# Patient Record
Sex: MALE | Race: OTHER | Employment: FULL TIME | URBAN - METROPOLITAN AREA
[De-identification: names, ages, dates, MRNs, and addresses within clinical notes are randomized per-mention and may not be internally consistent; named-entity substitution may affect disease eponyms.]

---

## 2019-03-06 ENCOUNTER — HOSPITAL ENCOUNTER (OUTPATIENT)
Age: 44
Discharge: HOME | End: 2019-03-06
Attending: FAMILY MEDICINE
Payer: COMMERCIAL

## 2019-03-06 VITALS
TEMPERATURE: 99 F | HEART RATE: 92 BPM | RESPIRATION RATE: 18 BRPM | DIASTOLIC BLOOD PRESSURE: 72 MMHG | SYSTOLIC BLOOD PRESSURE: 115 MMHG | OXYGEN SATURATION: 100 %

## 2019-03-06 DIAGNOSIS — R07.0 THROAT PAIN: Primary | ICD-10-CM

## 2019-03-06 LAB — S PYO AG THROAT QL: NEGATIVE

## 2019-03-06 PROCEDURE — 87430 STREP A AG IA: CPT

## 2019-03-06 PROCEDURE — 99212 OFFICE O/P EST SF 10 MIN: CPT

## 2019-03-06 PROCEDURE — 99202 OFFICE O/P NEW SF 15 MIN: CPT

## 2019-03-06 NOTE — ED INITIAL ASSESSMENT (HPI)
Pt here with complaints of a blister like bump he noticed to the top of his throat that developed 2 days ago, pt states the blister is very bother some especially when he is eating, pt denies any sore throat or fevers

## 2019-03-06 NOTE — ED PROVIDER NOTES
Patient Seen in: [address]    History   Patient presents with:  Sore Throat    Stated Complaint: May have blister behind throat    HPI    29-year-old male presents with a lesion on the back of his throat.   He notes rodriguez Cardiovascular: Normal rate and regular rhythm. Pulmonary/Chest: Effort normal and breath sounds normal.   Abdominal: Soft. Lymphadenopathy:     He has no cervical adenopathy. Neurological: He is alert and oriented to person, place, and time.    Ski

## 2019-04-01 ENCOUNTER — HOSPITAL ENCOUNTER (OUTPATIENT)
Age: 44
Discharge: HOME | End: 2019-04-01
Attending: FAMILY MEDICINE
Payer: COMMERCIAL

## 2019-04-01 VITALS
HEART RATE: 73 BPM | RESPIRATION RATE: 18 BRPM | WEIGHT: 147 LBS | OXYGEN SATURATION: 100 % | HEIGHT: 67 IN | SYSTOLIC BLOOD PRESSURE: 123 MMHG | BODY MASS INDEX: 23.07 KG/M2 | DIASTOLIC BLOOD PRESSURE: 59 MMHG | TEMPERATURE: 98 F

## 2019-04-01 DIAGNOSIS — T14.8XXA ABRASION OF SKIN: ICD-10-CM
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
## 2019-04-01 PROCEDURE — 99214 OFFICE O/P EST MOD 30 MIN: CPT

## 2019-04-01 PROCEDURE — 99213 OFFICE O/P EST LOW 20 MIN: CPT

## 2019-04-01 NOTE — ED INITIAL ASSESSMENT (HPI)
Pt in [address] [place] [address] c/o bug bite on left buttock/upper thigh noted 2 days ago. Pt reports bite has increased in size, slight tingling sensation and tenderness. Denied itching or pain. He reports slight headache. Applied otc cream to affected area.

## 2019-04-01 NOTE — ED PROVIDER NOTES
Patient Seen in: [address]    History   Patient presents with:  Rash Skin Problem (integumentary)    Stated Complaint: Bug Bite on Left thigh, slight headache     HPI    45-year-old male presents with a lesion on his ri sounds are normal.   Musculoskeletal: Normal range of motion. Neurological: He is alert and oriented to person, place, and time. Skin: Skin is warm and dry.    Left buttocks: 2 cm x 1 cm crusted abrasion with mildly erythematous borders no crepitus, flu

## 2023-03-30 DIAGNOSIS — D49.0 DUDLEY-KLINGENSTEIN SYNDROME: Primary | ICD-10-CM

## 2023-04-16 ENCOUNTER — HOSPITAL ENCOUNTER (OUTPATIENT)
Dept: MRI IMAGING | Age: 48
Discharge: HOME | End: 2023-04-16
Attending: INTERNAL MEDICINE

## 2023-04-16 DIAGNOSIS — D49.0 DUDLEY-KLINGENSTEIN SYNDROME: ICD-10-CM

## 2023-04-16 PROCEDURE — 10002805 HB CONTRAST AGENT

## 2023-04-16 PROCEDURE — G1004 CDSM NDSC: HCPCS

## 2023-04-16 PROCEDURE — A9585 GADOBUTROL INJECTION: HCPCS

## 2023-04-16 PROCEDURE — 72197 MRI PELVIS W/O & W/DYE: CPT

## 2023-04-16 RX ORDER — GADOBUTROL 604.72 MG/ML
7 INJECTION INTRAVENOUS ONCE
Status: COMPLETED | OUTPATIENT
Start: 2023-04-16 | End: 2023-04-16

## 2023-04-16 RX ADMIN — GADOBUTROL 7 ML: 604.72 INJECTION INTRAVENOUS at 11:24
